# Patient Record
Sex: MALE | Race: WHITE
[De-identification: names, ages, dates, MRNs, and addresses within clinical notes are randomized per-mention and may not be internally consistent; named-entity substitution may affect disease eponyms.]

---

## 2018-03-05 ENCOUNTER — HOSPITAL ENCOUNTER (OUTPATIENT)
Dept: HOSPITAL 53 - M RAD | Age: 68
End: 2018-03-05
Attending: INTERNAL MEDICINE
Payer: COMMERCIAL

## 2018-03-05 DIAGNOSIS — Z85.118: ICD-10-CM

## 2018-03-05 DIAGNOSIS — R91.8: ICD-10-CM

## 2018-03-05 DIAGNOSIS — Z12.2: Primary | ICD-10-CM

## 2018-03-05 DIAGNOSIS — F17.218: ICD-10-CM

## 2018-04-06 ENCOUNTER — HOSPITAL ENCOUNTER (OUTPATIENT)
Dept: HOSPITAL 53 - M LAB | Age: 68
End: 2018-04-06
Attending: SURGERY
Payer: COMMERCIAL

## 2018-04-06 DIAGNOSIS — I71.4: Primary | ICD-10-CM

## 2018-04-06 LAB
BLOOD UREA NITROGEN: 16 MG/DL (ref 7–18)
CREATININE FOR GFR: 0.97 MG/DL (ref 0.7–1.3)
GFR SERPL CREATININE-BSD FRML MDRD: > 60 ML/MIN/{1.73_M2} (ref 49–?)

## 2019-12-16 ENCOUNTER — HOSPITAL ENCOUNTER (EMERGENCY)
Dept: HOSPITAL 53 - M ED | Age: 69
Discharge: ANOTHER HOSPITAL | End: 2019-12-16
Payer: COMMERCIAL

## 2019-12-16 VITALS — HEIGHT: 71 IN | WEIGHT: 164.35 LBS | BODY MASS INDEX: 23.01 KG/M2

## 2019-12-16 VITALS — DIASTOLIC BLOOD PRESSURE: 61 MMHG | SYSTOLIC BLOOD PRESSURE: 110 MMHG
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
## 2019-12-16 DIAGNOSIS — T82.898A: ICD-10-CM

## 2019-12-16 DIAGNOSIS — R91.1: ICD-10-CM

## 2019-12-16 DIAGNOSIS — Z88.8: ICD-10-CM

## 2019-12-16 DIAGNOSIS — F17.210: ICD-10-CM

## 2019-12-16 DIAGNOSIS — I71.4: ICD-10-CM

## 2019-12-16 DIAGNOSIS — J44.1: Primary | ICD-10-CM

## 2019-12-16 DIAGNOSIS — Z79.899: ICD-10-CM

## 2019-12-16 LAB
ALBUMIN SERPL BCG-MCNC: 3.7 GM/DL (ref 3.2–5.2)
ALT SERPL W P-5'-P-CCNC: 23 U/L (ref 12–78)
BASOPHILS # BLD AUTO: 0 10^3/UL (ref 0–0.2)
BASOPHILS NFR BLD AUTO: 0.6 % (ref 0–1)
BILIRUB CONJ SERPL-MCNC: < 0.1 MG/DL (ref 0–0.2)
BILIRUB SERPL-MCNC: 0.3 MG/DL (ref 0.2–1)
BUN SERPL-MCNC: 10 MG/DL (ref 7–18)
CALCIUM SERPL-MCNC: 9.3 MG/DL (ref 8.8–10.2)
CHLORIDE SERPL-SCNC: 100 MEQ/L (ref 98–107)
CK MB CFR.DF SERPL CALC: 2.21
CK MB SERPL-MCNC: 3.6 NG/ML (ref ?–3.6)
CK SERPL-CCNC: 163 U/L (ref 39–308)
CO2 SERPL-SCNC: 33 MEQ/L (ref 21–32)
CREAT SERPL-MCNC: 0.79 MG/DL (ref 0.7–1.3)
EOSINOPHIL # BLD AUTO: 0 10^3/UL (ref 0–0.5)
EOSINOPHIL NFR BLD AUTO: 0.6 % (ref 0–3)
GFR SERPL CREATININE-BSD FRML MDRD: > 60 ML/MIN/{1.73_M2} (ref 49–?)
GLUCOSE SERPL-MCNC: 90 MG/DL (ref 70–100)
HCT VFR BLD AUTO: 42.9 % (ref 42–52)
HGB BLD-MCNC: 13.8 G/DL (ref 13.5–17.5)
LIPASE SERPL-CCNC: 108 U/L (ref 73–393)
LYMPHOCYTES # BLD AUTO: 0.8 10^3/UL (ref 1.5–5)
LYMPHOCYTES NFR BLD AUTO: 17.3 % (ref 24–44)
MCH RBC QN AUTO: 31.2 PG (ref 27–33)
MCHC RBC AUTO-ENTMCNC: 32.2 G/DL (ref 32–36.5)
MCV RBC AUTO: 96.8 FL (ref 80–96)
MONOCYTES # BLD AUTO: 0.6 10^3/UL (ref 0–0.8)
MONOCYTES NFR BLD AUTO: 11.9 % (ref 0–5)
NEUTROPHILS # BLD AUTO: 3.3 10^3/UL (ref 1.5–8.5)
NEUTROPHILS NFR BLD AUTO: 69.4 % (ref 36–66)
NT-PRO BNP: 59 PG/ML (ref ?–125)
PLATELET # BLD AUTO: 155 10^3/UL (ref 150–450)
POTASSIUM SERPL-SCNC: 4.4 MEQ/L (ref 3.5–5.1)
PROT SERPL-MCNC: 7 GM/DL (ref 6.4–8.2)
RBC # BLD AUTO: 4.43 10^6/UL (ref 4.3–6.1)
SODIUM SERPL-SCNC: 139 MEQ/L (ref 136–145)
T4 FREE SERPL-MCNC: 1.34 NG/DL (ref 0.76–1.46)
TROPONIN I SERPL-MCNC: < 0.02 NG/ML (ref ?–0.1)
TSH SERPL DL<=0.005 MIU/L-ACNC: 1.4 UIU/ML (ref 0.36–3.74)
WBC # BLD AUTO: 4.8 10^3/UL (ref 4–10)

## 2019-12-16 PROCEDURE — 71275 CT ANGIOGRAPHY CHEST: CPT

## 2019-12-16 PROCEDURE — 85025 COMPLETE CBC W/AUTO DIFF WBC: CPT

## 2019-12-16 PROCEDURE — 93005 ELECTROCARDIOGRAM TRACING: CPT

## 2019-12-16 PROCEDURE — 93041 RHYTHM ECG TRACING: CPT

## 2019-12-16 PROCEDURE — 83880 ASSAY OF NATRIURETIC PEPTIDE: CPT

## 2019-12-16 PROCEDURE — 84439 ASSAY OF FREE THYROXINE: CPT

## 2019-12-16 PROCEDURE — 96374 THER/PROPH/DIAG INJ IV PUSH: CPT

## 2019-12-16 PROCEDURE — 74177 CT ABD & PELVIS W/CONTRAST: CPT

## 2019-12-16 PROCEDURE — 94640 AIRWAY INHALATION TREATMENT: CPT

## 2019-12-16 PROCEDURE — 71046 X-RAY EXAM CHEST 2 VIEWS: CPT

## 2019-12-16 PROCEDURE — 82553 CREATINE MB FRACTION: CPT

## 2019-12-16 PROCEDURE — 80048 BASIC METABOLIC PNL TOTAL CA: CPT

## 2019-12-16 PROCEDURE — 99285 EMERGENCY DEPT VISIT HI MDM: CPT

## 2019-12-16 PROCEDURE — 94760 N-INVAS EAR/PLS OXIMETRY 1: CPT

## 2019-12-16 PROCEDURE — 84443 ASSAY THYROID STIM HORMONE: CPT

## 2019-12-16 PROCEDURE — 84484 ASSAY OF TROPONIN QUANT: CPT

## 2019-12-16 PROCEDURE — 80076 HEPATIC FUNCTION PANEL: CPT

## 2019-12-16 PROCEDURE — 83690 ASSAY OF LIPASE: CPT

## 2019-12-16 PROCEDURE — 82550 ASSAY OF CK (CPK): CPT

## 2019-12-16 NOTE — REP
CT ANGIOGRAM CHEST:

 

TECHNIQUE:  Axial contrast enhanced images from the thoracic inlet to the upper

abdomen using 100 mL Isovue 370 intravenous contrast material with multiplanar

reformations.

 

There is no CT evidence of pulmonary embolism. There is no thoracic aortic

aneurysm or dissection. Heart is not enlarged. There is no mediastinal, hilar, or

chest wall lymphadenopathy. There is no pleural or pericardial effusion. There

are degenerative changes of the spine. Lungs show no evidence of acute

infiltrate. There is emphysematous change primarily in the upper lobes with

pleuroparenchymal scarring.

 

IMPRESSION:

 

No CT evidence of pulmonary embolism or aortic dissection. Emphysematous and

chronic fibrotic changes. No infiltrate.

 

 

Electronically Signed by

Gareth Fleming MD 12/17/2019 04:20 P

## 2019-12-16 NOTE — REP
CT ABDOMEN AND PELVIS WITH IV CONTRAST:

 

TECHNIQUE:  Axial contrast enhanced images from the lung bases to the pubic

symphysis using 100 mL Isovue 370 intravenous contrast material with multiplanar

reformations.

 

COMPARISON:  11/04/2013.

 

The liver demonstrates multiple hypodensities as on prior study most consistent

with multiple small liver cysts.  The gallbladder is grossly unremarkable.

Spleen is normal in size with no gross intrinsic abnormality.  No adrenal mass is

seen.  Pancreas appears unremarkable.  There is a left renal cyst slightly

greater than 1 cm in diameter.  There is no hydronephrosis.  Abdominal aortic

aneurysm is again seen.  It measures approximately 5.8 x 6.1 cm, previously 5.0 x

5.6 cm on the exam of 11/04/2013.  Aortobiiliac stent is again noted.  Posterior

to the bifurcated stent within the aneurysm there is a small endoleak which has

slightly increased in size since the priori study.

 

No adenopathy, free air or free fluid is seen.  No definite bowel wall thickening

is seen.  No definite pelvic mass is seen.  Urinary bladder is mildly distended

and grossly unremarkable.  There are degenerative changes of the spine.

 

IMPRESSION:

 

Aortobiiliac stent within an abdominal aortic aneurysm.  The aneurysm has mildly

increased in size prior study of 11/04/2013 as discussed above.  In addition

there is a small endoleak posteriorly within the aneurysm which has slightly

increased in size.

 

No other evidence of acute finding in the abdomen or pelvis.  There is a small

left inguinal hernia containing fat which is slightly edematous.

 

 

Electronically Signed by

Gareth Fleming MD 12/17/2019 04:21 P

## 2019-12-16 NOTE — REP
Chest x-ray:  Two views.

 

History:  Chest pain.

 

Comparison chest x-ray:  November 4, 2013.

 

Findings:  There is an old healed rib fracture on the left.  EKG electrodes and

oxygen delivery tubing are seen.  There is rather marked hyperinflation of the

lungs consistent with COPD.  No acute infiltrate is seen.  Heart is not enlarged.

Aorta is slightly tortuous.  Pulmonary artery essentially prominent question

pulmonary arterial hypertension.

 

Impression:

 

Evidence of COPD question pulmonary arterial hypertension.  No acute infiltrate.

 

 

Electronically Signed by

Cruzito Doty MD 12/16/2019 11:28 A

## 2019-12-17 NOTE — ECGEPIP
[facility] - ED

                                       

                                       Test Date:    2019

Pat Name:     LEATHA ORDAZ          Department:   

Patient ID:   [id number]                 Room:         -

Gender:       Male                     Technician:   TC

:          1950               Requested By: FERNANDO MENESES

Order Number: [id number]     Reading MD:   Lisa Eric

                                 Measurements

Intervals                              Axis          

Rate:         85                       P:            87

VA:           149                      QRS:          -78

QRSD:         88                       T:            74

QT:           347                                    

QTc:          413                                    

                           Interpretive Statements

SINUS RHYTHM

POSSIBLE RIGHT ATRIAL ENLARGEMENT

MARKED LEFT AXIS DEVIATION

DELAYED R PROGRESSION

LOW VOLTAGE LIMB

NO PRIOR

Electronically Signed on 2019 21:56:58 EST by Lisa Eric

## 2020-01-28 ENCOUNTER — HOSPITAL ENCOUNTER (EMERGENCY)
Dept: HOSPITAL 53 - M ED | Age: 70
Discharge: HOME | End: 2020-01-28
Payer: COMMERCIAL

## 2020-01-28 VITALS — DIASTOLIC BLOOD PRESSURE: 73 MMHG | SYSTOLIC BLOOD PRESSURE: 123 MMHG

## 2020-01-28 VITALS — BODY MASS INDEX: 20.9 KG/M2 | WEIGHT: 154.32 LBS | HEIGHT: 72 IN

## 2020-01-28 DIAGNOSIS — Z88.3: ICD-10-CM

## 2020-01-28 DIAGNOSIS — J44.9: ICD-10-CM

## 2020-01-28 DIAGNOSIS — Z79.51: ICD-10-CM

## 2020-01-28 DIAGNOSIS — M51.36: ICD-10-CM

## 2020-01-28 DIAGNOSIS — F17.210: ICD-10-CM

## 2020-01-28 DIAGNOSIS — K43.9: ICD-10-CM

## 2020-01-28 DIAGNOSIS — R10.9: Primary | ICD-10-CM

## 2020-01-28 DIAGNOSIS — Z79.899: ICD-10-CM

## 2020-01-28 DIAGNOSIS — I71.4: ICD-10-CM

## 2020-01-28 LAB
ALBUMIN SERPL BCG-MCNC: 3.6 GM/DL (ref 3.2–5.2)
ALT SERPL W P-5'-P-CCNC: 24 U/L (ref 12–78)
APTT BLD: 29.3 SECONDS (ref 25–38.4)
BASOPHILS # BLD AUTO: 0 10^3/UL (ref 0–0.2)
BASOPHILS NFR BLD AUTO: 0.2 % (ref 0–1)
BILIRUB CONJ SERPL-MCNC: 0.1 MG/DL (ref 0–0.2)
BILIRUB SERPL-MCNC: 0.5 MG/DL (ref 0.2–1)
CK MB CFR.DF SERPL CALC: 2.5
CK MB SERPL-MCNC: 5.4 NG/ML (ref ?–3.6)
CK SERPL-CCNC: 216 U/L (ref 39–308)
EOSINOPHIL # BLD AUTO: 0 10^3/UL (ref 0–0.5)
EOSINOPHIL NFR BLD AUTO: 1 % (ref 0–3)
HCT VFR BLD AUTO: 42.4 % (ref 42–52)
HGB BLD-MCNC: 13.5 G/DL (ref 13.5–17.5)
INR PPP: 1.08
LIPASE SERPL-CCNC: 125 U/L (ref 73–393)
LYMPHOCYTES # BLD AUTO: 0.6 10^3/UL (ref 1.5–5)
LYMPHOCYTES NFR BLD AUTO: 13.9 % (ref 24–44)
MCH RBC QN AUTO: 30.8 PG (ref 27–33)
MCHC RBC AUTO-ENTMCNC: 31.8 G/DL (ref 32–36.5)
MCV RBC AUTO: 96.8 FL (ref 80–96)
MONOCYTES # BLD AUTO: 0.6 10^3/UL (ref 0–0.8)
MONOCYTES NFR BLD AUTO: 15.1 % (ref 0–5)
NEUTROPHILS # BLD AUTO: 2.9 10^3/UL (ref 1.5–8.5)
NEUTROPHILS NFR BLD AUTO: 69.8 % (ref 36–66)
PLATELET # BLD AUTO: 125 10^3/UL (ref 150–450)
PROT SERPL-MCNC: 6.6 GM/DL (ref 6.4–8.2)
PROTHROMBIN TIME: 13.7 SECONDS (ref 11.8–14)
RBC # BLD AUTO: 4.38 10^6/UL (ref 4.3–6.1)
TROPONIN I SERPL-MCNC: < 0.02 NG/ML (ref ?–0.1)
WBC # BLD AUTO: 4.2 10^3/UL (ref 4–10)

## 2020-01-28 PROCEDURE — 80076 HEPATIC FUNCTION PANEL: CPT

## 2020-01-28 PROCEDURE — 83605 ASSAY OF LACTIC ACID: CPT

## 2020-01-28 PROCEDURE — 86901 BLOOD TYPING SEROLOGIC RH(D): CPT

## 2020-01-28 PROCEDURE — 71275 CT ANGIOGRAPHY CHEST: CPT

## 2020-01-28 PROCEDURE — 82550 ASSAY OF CK (CPK): CPT

## 2020-01-28 PROCEDURE — 87040 BLOOD CULTURE FOR BACTERIA: CPT

## 2020-01-28 PROCEDURE — 99285 EMERGENCY DEPT VISIT HI MDM: CPT

## 2020-01-28 PROCEDURE — 85730 THROMBOPLASTIN TIME PARTIAL: CPT

## 2020-01-28 PROCEDURE — 83690 ASSAY OF LIPASE: CPT

## 2020-01-28 PROCEDURE — 96375 TX/PRO/DX INJ NEW DRUG ADDON: CPT

## 2020-01-28 PROCEDURE — 86850 RBC ANTIBODY SCREEN: CPT

## 2020-01-28 PROCEDURE — 93041 RHYTHM ECG TRACING: CPT

## 2020-01-28 PROCEDURE — 86900 BLOOD TYPING SEROLOGIC ABO: CPT

## 2020-01-28 PROCEDURE — 74174 CTA ABD&PLVS W/CONTRAST: CPT

## 2020-01-28 PROCEDURE — 84484 ASSAY OF TROPONIN QUANT: CPT

## 2020-01-28 PROCEDURE — 96374 THER/PROPH/DIAG INJ IV PUSH: CPT

## 2020-01-28 PROCEDURE — 85025 COMPLETE CBC W/AUTO DIFF WBC: CPT

## 2020-01-28 PROCEDURE — 80047 BASIC METABLC PNL IONIZED CA: CPT

## 2020-01-28 PROCEDURE — 85610 PROTHROMBIN TIME: CPT

## 2020-01-28 PROCEDURE — 93005 ELECTROCARDIOGRAM TRACING: CPT

## 2020-01-28 PROCEDURE — 81001 URINALYSIS AUTO W/SCOPE: CPT

## 2020-01-28 PROCEDURE — 82553 CREATINE MB FRACTION: CPT

## 2020-01-28 NOTE — ECGEPIP
[facility] - ED

                                       

                                       Test Date:    2020

Pat Name:     LEATHA ORDAZ          Department:   

Patient ID:   [id number]                 Room:         -

Gender:       Male                     Technician:   

:          1950               Requested By: Maja Lundborg-Gray 

Order Number: [id number]     Reading MD:   Ayaan Walsh

                                 Measurements

Intervals                              Axis          

Rate:         89                       P:            83

UT:           159                      QRS:          -90

QRSD:         88                       T:            82

QT:           345                                    

QTc:          422                                    

                           Interpretive Statements

SINUS RHYTHM

LEFT AXIS DEVIATION

SIMILAR TO 19

Electronically Signed on 2020 18:32:31 EST by Ayaan Walsh

## 2020-01-28 NOTE — REP
CT ANGIOGRAM ABDOMEN AND PELVIS:

 

CT angiogram of the abdomen and pelvis performed following the intravenous

administration of 100 mL of Isovue 370.  Sagittal, coronal and 3D MIP

reconstruction images are performed.

 

Comparison is made with a made with a prior study of 12/16/2019.

 

Distal abdominal aortic aneurysm is unchanged in size measuring approximately 5.8

x 6.1 cm.  Aortobiiliac stent is patent.  Small posterior endoleak in the

aneurysm has decreased in size when compared to the prior study.

 

Multiple cysts are again seen in the liver and there are a few bilateral renal

cysts.  Spleen, adrenals, and pancreas are unremarkable.  I see no adenopathy.

There is no free air or free fluid.  I see no evidence of bowel wall thickening.

Urinary bladder is not well distended and not well evaluated.  Small left

inguinal hernia contains fat and is unchanged compared to the prior study.  Mild

compression deformity of L1 is stable. There are degenerative changes of the

spine.

 

IMPRESSION:

 

Stable distal abdominal aortic aneurysm.  Small posterior endoleak has decreased

since the prior study of 12/16/2019. There is also no change in the small left

inguinal hernia containing fat.  The size of the distal abdominal aneurysm is

stable.

 

 

Electronically Signed by

Gareth Fleming MD 01/29/2020 01:38 P

## 2020-01-28 NOTE — REP
CT ANGIO CHEST:

 

TECHNIQUE:  Axial contrast enhanced images from the thoracic inlet to the upper

abdomen using 100 mL Isovue 370 intravenous contrast material with multiplanar

reformations.

 

Comparison 12/16/2019

 

There is no evidence of aneurysm of the thoracic aorta.  No dissection is seen.

Visualized pulmonary arteries demonstrate no definite filling defect. The heart

is not enlarged.  Very mild anterior inferior pericardial fluid or thickening is

again noted. There is no pleural effusion.  There is diffuse emphysematous change

and fibrotic change in both lungs with no consolidating infiltrate. There are

degenerative changes of the spine.  Old compression deformity of L1 is noted.

 

IMPRESSION:

 

Diffuse emphysematous change and interstitial fibrosis.  No evidence of thoracic

aortic aneurysm or dissection.

 

 

Electronically Signed by

Gareth Fleming MD 01/29/2020 01:38 P

## 2020-01-29 ENCOUNTER — HOSPITAL ENCOUNTER (EMERGENCY)
Dept: HOSPITAL 53 - M ED | Age: 70
Discharge: HOME | End: 2020-01-29
Payer: COMMERCIAL

## 2020-01-29 VITALS — WEIGHT: 154.32 LBS | HEIGHT: 72 IN | BODY MASS INDEX: 20.9 KG/M2

## 2020-01-29 VITALS — SYSTOLIC BLOOD PRESSURE: 106 MMHG | DIASTOLIC BLOOD PRESSURE: 62 MMHG

## 2020-01-29 DIAGNOSIS — K76.89: ICD-10-CM

## 2020-01-29 DIAGNOSIS — K40.90: Primary | ICD-10-CM

## 2020-01-29 DIAGNOSIS — R94.31: ICD-10-CM

## 2020-01-29 DIAGNOSIS — F17.210: ICD-10-CM

## 2020-01-29 DIAGNOSIS — Z79.899: ICD-10-CM

## 2020-01-29 DIAGNOSIS — K59.00: ICD-10-CM

## 2020-01-29 DIAGNOSIS — Z86.73: ICD-10-CM

## 2020-01-29 DIAGNOSIS — Z79.51: ICD-10-CM

## 2020-01-29 DIAGNOSIS — J44.1: ICD-10-CM

## 2020-01-29 DIAGNOSIS — I71.4: ICD-10-CM

## 2020-01-29 DIAGNOSIS — N28.1: ICD-10-CM

## 2020-01-29 DIAGNOSIS — K21.9: ICD-10-CM

## 2020-01-29 DIAGNOSIS — F90.9: ICD-10-CM

## 2020-01-29 DIAGNOSIS — Z88.3: ICD-10-CM

## 2020-01-29 LAB
ALBUMIN SERPL BCG-MCNC: 3.6 GM/DL (ref 3.2–5.2)
ALT SERPL W P-5'-P-CCNC: 22 U/L (ref 12–78)
BASOPHILS # BLD AUTO: 0 10^3/UL (ref 0–0.2)
BASOPHILS NFR BLD AUTO: 0.5 % (ref 0–1)
BILIRUB CONJ SERPL-MCNC: 0.1 MG/DL (ref 0–0.2)
BILIRUB SERPL-MCNC: 0.5 MG/DL (ref 0.2–1)
CK MB CFR.DF SERPL CALC: 2.32
CK MB SERPL-MCNC: 4.6 NG/ML (ref ?–3.6)
CK SERPL-CCNC: 198 U/L (ref 39–308)
EOSINOPHIL # BLD AUTO: 0 10^3/UL (ref 0–0.5)
EOSINOPHIL NFR BLD AUTO: 0.5 % (ref 0–3)
HCT VFR BLD AUTO: 39.5 % (ref 42–52)
HGB BLD-MCNC: 12.6 G/DL (ref 13.5–17.5)
LIPASE SERPL-CCNC: 81 U/L (ref 73–393)
LYMPHOCYTES # BLD AUTO: 0.5 10^3/UL (ref 1.5–5)
LYMPHOCYTES NFR BLD AUTO: 12.2 % (ref 24–44)
MCH RBC QN AUTO: 30.9 PG (ref 27–33)
MCHC RBC AUTO-ENTMCNC: 31.9 G/DL (ref 32–36.5)
MCV RBC AUTO: 96.8 FL (ref 80–96)
MONOCYTES # BLD AUTO: 0.6 10^3/UL (ref 0–0.8)
MONOCYTES NFR BLD AUTO: 14.8 % (ref 0–5)
NEUTROPHILS # BLD AUTO: 3.1 10^3/UL (ref 1.5–8.5)
NEUTROPHILS NFR BLD AUTO: 71.8 % (ref 36–66)
PLATELET # BLD AUTO: 116 10^3/UL (ref 150–450)
PROT SERPL-MCNC: 6.3 GM/DL (ref 6.4–8.2)
RBC # BLD AUTO: 4.08 10^6/UL (ref 4.3–6.1)
TROPONIN I SERPL-MCNC: < 0.02 NG/ML (ref ?–0.1)
WBC # BLD AUTO: 4.3 10^3/UL (ref 4–10)

## 2020-01-29 PROCEDURE — 36600 WITHDRAWAL OF ARTERIAL BLOOD: CPT

## 2020-01-29 PROCEDURE — 96374 THER/PROPH/DIAG INJ IV PUSH: CPT

## 2020-01-29 PROCEDURE — 99285 EMERGENCY DEPT VISIT HI MDM: CPT

## 2020-01-29 PROCEDURE — 83605 ASSAY OF LACTIC ACID: CPT

## 2020-01-29 PROCEDURE — 96375 TX/PRO/DX INJ NEW DRUG ADDON: CPT

## 2020-01-29 PROCEDURE — 93005 ELECTROCARDIOGRAM TRACING: CPT

## 2020-01-29 PROCEDURE — 71275 CT ANGIOGRAPHY CHEST: CPT

## 2020-01-29 PROCEDURE — 94760 N-INVAS EAR/PLS OXIMETRY 1: CPT

## 2020-01-29 PROCEDURE — 85025 COMPLETE CBC W/AUTO DIFF WBC: CPT

## 2020-01-29 PROCEDURE — 70450 CT HEAD/BRAIN W/O DYE: CPT

## 2020-01-29 PROCEDURE — 80076 HEPATIC FUNCTION PANEL: CPT

## 2020-01-29 PROCEDURE — 82550 ASSAY OF CK (CPK): CPT

## 2020-01-29 PROCEDURE — 96361 HYDRATE IV INFUSION ADD-ON: CPT

## 2020-01-29 PROCEDURE — 83690 ASSAY OF LIPASE: CPT

## 2020-01-29 PROCEDURE — 74174 CTA ABD&PLVS W/CONTRAST: CPT

## 2020-01-29 PROCEDURE — 84484 ASSAY OF TROPONIN QUANT: CPT

## 2020-01-29 PROCEDURE — 82803 BLOOD GASES ANY COMBINATION: CPT

## 2020-01-29 PROCEDURE — 82553 CREATINE MB FRACTION: CPT

## 2020-01-29 PROCEDURE — 87040 BLOOD CULTURE FOR BACTERIA: CPT

## 2020-01-29 PROCEDURE — 96376 TX/PRO/DX INJ SAME DRUG ADON: CPT

## 2020-01-29 PROCEDURE — 80047 BASIC METABLC PNL IONIZED CA: CPT

## 2020-01-29 NOTE — REP
CT ANGIOGRAM CHEST:

 

TECHNIQUE:  Axial contrast enhanced images from the thoracic inlet to the upper

abdomen using 100 mL Isovue 370 intravenous contrast material with multiplanar

reformations.

 

COMPARISON: 01/20/2020 as well as other prior exams.

 

There is no CT evidence of pulmonary embolism.  There is no thoracic aneurysm or

dissection.  The heart is not enlarged. There is no pleural or pericardial

effusion.  There is no mediastinal, hilar or chest wall lymphadenopathy.  There

are degenerative changes of the spine.  Diffuse emphysematous and fibrotic

changes are again noted unchanged.  No new infiltrate is seen.

 

IMPRESSION:

 

Stable exam with no acute finding.

 

 

Electronically Signed by

Gareth Fleming MD 01/29/2020 11:08 P

## 2020-01-29 NOTE — ECGEPIP
[facility] - ED

                                       

                                       Test Date:    2020

Pat Name:     LEATHA ORDAZ          Department:   

Patient ID:   [id number]                 Room:         -

Gender:       Male                     Technician:   amy

:          1950               Requested By: Lisa Eric 

Order Number: [id number]     Reading MD:   Francisco Burns

                                 Measurements

Intervals                              Axis          

Rate:         80                       P:            269

ME:           93                       QRS:          -16

QRSD:         98                       T:            69

QT:           354                                    

QTc:          411                                    

                           Interpretive Statements

JUNCTIONAL RHYTHM

Low QRS complex voltage in the limb leads

Nonspecific ST-T wave abnormalities

Baseline artifact

Electronically Signed on 2020 14:58:23 EST by Francisco Burns

## 2020-01-29 NOTE — REP
CT BRAIN WITHOUT IV CONTRAST:

 

CT brain performed without IV contrast.  Coronal reconstruction images are

performed.

 

Ventricles are normal in size and position with no midline shift or mass effect.

Gray/white differentiation is well maintained.  There is no acute intracranial

hemorrhage or extra-axial fluid collection.  Bone window examination is

unremarkable.  Visualized paranasal sinuses are clear.

 

IMPRESSION:

 

Negative noncontrast CT of the brain.

 

 

Electronically Signed by

Gareth Fleming MD 01/29/2020 11:07 P

## 2020-01-29 NOTE — REP
CT ANGIOGRAM ABDOMEN AND PELVIS:

 

CT angiogram of the abdomen and pelvis performed following the intravenous

administration of 100 mL of Isovue 370.  Sagittal, coronal, and 3D MIP

reconstruction images are performed.

 

Comparison is made with prior studies, most recently 01/28/2020.

 

Once again, there are multiple cysts seen in the liver.  Spleen is normal in

size. The adrenals and pancreas are unremarkable.  A cyst is see in each kidney

with no hydronephrosis.  Distal abdominal aortic aneurysm is unchanged in size

and appearance.  Aortobiiliac stent is patent, filling with contrast.  A small

posterior endoleak in the aneurysm is stable.  No adenopathy is seen.  There is

no free air.  The appendix appears normal.  Left inguinal hernia is again noted.

There is now a moderately dilated fluid-filled small bowel loop in the hernia

with surrounding edema.  The more proximal small bowel appears increased in

caliber suggesting small bowel obstruction.  I suspect there is an incarcerated

small bowel loop in the left inguinal hernia with possible strangulation.  Trace

free fluid is seen in the pelvis.

 

IMPRESSION:

 

There is now a moderately dilated small bowel loop in the left inguinal hernia,

likely incarcerated and possibly strangulated.  There is increased caliber of the

more proximal small bowel suggesting small bowel obstruction.  No free air.

Trace free fluid in the pelvis.

 

Distal abdominal aortic aneurysm is unchanged in size and appearance.

Aortobi-iliac stent remains patent.  Small endoleak in the aneurysm is stable.

 

 

Electronically Signed by

Gareth Fleming MD 01/29/2020 11:09 P

## 2020-02-24 ENCOUNTER — HOSPITAL ENCOUNTER (EMERGENCY)
Dept: HOSPITAL 53 - M ED | Age: 70
Discharge: ANOTHER HOSPITAL | End: 2020-02-24
Payer: COMMERCIAL

## 2020-02-24 VITALS — HEIGHT: 72 IN | BODY MASS INDEX: 19.56 KG/M2 | WEIGHT: 144.4 LBS

## 2020-02-24 VITALS — OXYGEN SATURATION: 99 %

## 2020-02-24 VITALS — DIASTOLIC BLOOD PRESSURE: 76 MMHG | SYSTOLIC BLOOD PRESSURE: 109 MMHG

## 2020-02-24 DIAGNOSIS — G93.5: ICD-10-CM

## 2020-02-24 DIAGNOSIS — F17.210: ICD-10-CM

## 2020-02-24 DIAGNOSIS — Z79.899: ICD-10-CM

## 2020-02-24 DIAGNOSIS — R41.82: ICD-10-CM

## 2020-02-24 DIAGNOSIS — S06.5X9A: Primary | ICD-10-CM

## 2020-02-24 DIAGNOSIS — M43.02: ICD-10-CM
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
## 2020-02-24 DIAGNOSIS — Z79.51: ICD-10-CM
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
## 2020-02-24 DIAGNOSIS — J44.1: ICD-10-CM

## 2020-02-24 DIAGNOSIS — S00.03XA: ICD-10-CM

## 2020-02-24 DIAGNOSIS — Z88.3: ICD-10-CM

## 2020-02-24 LAB
ALBUMIN SERPL BCG-MCNC: 3.5 GM/DL (ref 3.2–5.2)
ALT SERPL W P-5'-P-CCNC: 39 U/L (ref 12–78)
APAP SERPL-MCNC: < 2 UG/ML (ref 10–30)
BASE EXCESS BLDA CALC-SCNC: 4.9 MMOL/L (ref -2–2)
BASOPHILS # BLD AUTO: 0 10^3/UL (ref 0–0.2)
BASOPHILS NFR BLD AUTO: 0.2 % (ref 0–1)
BILIRUB CONJ SERPL-MCNC: 0.2 MG/DL (ref 0–0.2)
BILIRUB SERPL-MCNC: 0.5 MG/DL (ref 0.2–1)
BUN SERPL-MCNC: 12 MG/DL (ref 7–18)
CALCIUM SERPL-MCNC: 8.6 MG/DL (ref 8.8–10.2)
CHLORIDE SERPL-SCNC: 94 MEQ/L (ref 98–107)
CK MB CFR.DF SERPL CALC: 2.81
CK MB CFR.DF SERPL CALC: 3.02
CK MB SERPL-MCNC: 13 NG/ML (ref ?–3.6)
CK MB SERPL-MCNC: 16.9 NG/ML (ref ?–3.6)
CK SERPL-CCNC: 430 U/L (ref 39–308)
CK SERPL-CCNC: 601 U/L (ref 39–308)
CO2 BLDA CALC-SCNC: 34.7 MEQ/L (ref 23–31)
CO2 SERPL-SCNC: 35 MEQ/L (ref 21–32)
CREAT SERPL-MCNC: 0.68 MG/DL (ref 0.7–1.3)
EOSINOPHIL # BLD AUTO: 0 10^3/UL (ref 0–0.5)
EOSINOPHIL NFR BLD AUTO: 0.2 % (ref 0–3)
ETHANOL SERPL-MCNC: < 0.003 % (ref 0–0.01)
GFR SERPL CREATININE-BSD FRML MDRD: > 60 ML/MIN/{1.73_M2} (ref 49–?)
GLUCOSE SERPL-MCNC: 110 MG/DL (ref 70–100)
HCO3 BLDA-SCNC: 32.7 MEQ/L (ref 22–26)
HCO3 STD BLDA-SCNC: 28.8 MEQ/L (ref 22–26)
HCT VFR BLD AUTO: 38.8 % (ref 42–52)
HGB BLD-MCNC: 12.9 G/DL (ref 13.5–17.5)
LYMPHOCYTES # BLD AUTO: 0.4 10^3/UL (ref 1.5–5)
LYMPHOCYTES NFR BLD AUTO: 7.4 % (ref 24–44)
MCH RBC QN AUTO: 31.1 PG (ref 27–33)
MCHC RBC AUTO-ENTMCNC: 33.2 G/DL (ref 32–36.5)
MCV RBC AUTO: 93.5 FL (ref 80–96)
MONOCYTES # BLD AUTO: 0.6 10^3/UL (ref 0–0.8)
MONOCYTES NFR BLD AUTO: 11.4 % (ref 0–5)
NEUTROPHILS # BLD AUTO: 4.5 10^3/UL (ref 1.5–8.5)
NEUTROPHILS NFR BLD AUTO: 80.6 % (ref 36–66)
PCO2 BLDA: 64.4 MMHG (ref 35–45)
PH BLDA: 7.32 UNITS (ref 7.35–7.45)
PLATELET # BLD AUTO: 146 10^3/UL (ref 150–450)
PO2 BLDA: 90 MMHG (ref 75–100)
POTASSIUM SERPL-SCNC: 4.8 MEQ/L (ref 3.5–5.1)
PROT SERPL-MCNC: 6.6 GM/DL (ref 6.4–8.2)
RBC # BLD AUTO: 4.15 10^6/UL (ref 4.3–6.1)
SALICYLATES SERPL-MCNC: 2.1 MG/DL (ref 5–30)
SAO2 % BLDA: 96.4 % (ref 95–99)
SODIUM SERPL-SCNC: 132 MEQ/L (ref 136–145)
TROPONIN I SERPL-MCNC: < 0.02 NG/ML (ref ?–0.1)
TROPONIN I SERPL-MCNC: < 0.02 NG/ML (ref ?–0.1)
WBC # BLD AUTO: 5.5 10^3/UL (ref 4–10)

## 2020-02-24 PROCEDURE — 96375 TX/PRO/DX INJ NEW DRUG ADDON: CPT

## 2020-02-24 PROCEDURE — 71045 X-RAY EXAM CHEST 1 VIEW: CPT

## 2020-02-24 PROCEDURE — 82803 BLOOD GASES ANY COMBINATION: CPT

## 2020-02-24 PROCEDURE — 80048 BASIC METABOLIC PNL TOTAL CA: CPT

## 2020-02-24 PROCEDURE — 93041 RHYTHM ECG TRACING: CPT

## 2020-02-24 PROCEDURE — 70450 CT HEAD/BRAIN W/O DYE: CPT

## 2020-02-24 PROCEDURE — 80076 HEPATIC FUNCTION PANEL: CPT

## 2020-02-24 PROCEDURE — 36600 WITHDRAWAL OF ARTERIAL BLOOD: CPT

## 2020-02-24 PROCEDURE — 99285 EMERGENCY DEPT VISIT HI MDM: CPT

## 2020-02-24 PROCEDURE — 12053 INTMD RPR FACE/MM 5.1-7.5 CM: CPT

## 2020-02-24 PROCEDURE — 94640 AIRWAY INHALATION TREATMENT: CPT

## 2020-02-24 PROCEDURE — 94660 CPAP INITIATION&MGMT: CPT

## 2020-02-24 PROCEDURE — 84484 ASSAY OF TROPONIN QUANT: CPT

## 2020-02-24 PROCEDURE — 96372 THER/PROPH/DIAG INJ SC/IM: CPT

## 2020-02-24 PROCEDURE — 96361 HYDRATE IV INFUSION ADD-ON: CPT

## 2020-02-24 PROCEDURE — 84146 ASSAY OF PROLACTIN: CPT

## 2020-02-24 PROCEDURE — 96374 THER/PROPH/DIAG INJ IV PUSH: CPT

## 2020-02-24 PROCEDURE — 94760 N-INVAS EAR/PLS OXIMETRY 1: CPT

## 2020-02-24 PROCEDURE — 87040 BLOOD CULTURE FOR BACTERIA: CPT

## 2020-02-24 PROCEDURE — 82140 ASSAY OF AMMONIA: CPT

## 2020-02-24 PROCEDURE — 85025 COMPLETE CBC W/AUTO DIFF WBC: CPT

## 2020-02-24 PROCEDURE — 72125 CT NECK SPINE W/O DYE: CPT

## 2020-02-24 PROCEDURE — 82553 CREATINE MB FRACTION: CPT

## 2020-02-24 PROCEDURE — 82550 ASSAY OF CK (CPK): CPT

## 2020-02-24 PROCEDURE — 31500 INSERT EMERGENCY AIRWAY: CPT

## 2020-02-24 NOTE — REP
Portable chest x-ray:  Sitting AP view.

 

History:  Altered mental status.

 

Comparison study:  December 16, 2019.

 

Findings:  Oxygen delivery tubing and monitoring electrodes are seen.  The lungs

are hyperinflated consistent with some degree of COPD.  No infiltrate is seen.

Pleural angles are sharp.  There is an old healed rib fracture on the left

posteriorly.  Heart is not enlarged.  Pulmonary vasculature is not increased

 

Impression:

 

Hyperinflation consistent with some degree of COPD.  No acute infiltrate seen.

 

 

Electronically Signed by

Cruzito Doty MD 02/24/2020 03:57 P

## 2020-02-24 NOTE — REP
Portable chest x-ray:  Two views presented.

 

History:  Endotracheal tube placement.

 

Comparison chest x-ray:  February 24, 2020.

 

Findings:  Endotracheal tube is seen in good position at the level of proximal

clavicles.  EKG monitoring electrodes overlie the chest.  The lungs are

hyperinflated but remain clear.  There is an old healed rib fracture on the

left.

 

Impression:

 

Endotracheal tube in good position.  Hyperinflation consistent with COPD.  No

acute infiltrate.

 

 

Electronically Signed by

Cruzito Doty MD 02/24/2020 07:04 P

## 2020-02-24 NOTE — REP
CT brain without contrast:

 

History:  Trauma.

 

Findings:  Digital preliminary  radiograph is unremarkable.  There is soft

tissue swelling over the left anterior orbital margin and frontal bone region.

No skull fractures seen.  Visualized paranasal sinuses are clear including the

frontal sinuses.  No intraorbital abnormality is appreciated.

 

On soft tissue window settings, lateral, third, and fourth ventricles are normal

in size and position.  Gray-white differentiation pattern is intact above and

below the tentorium.  There is no evidence of intracranial hemorrhage.  No

extra-axial fluid collection is seen.  No mass or midline shift is observed.

 

Impression:

 

Left frontal scalp swelling and hematoma.  No skull fracture or intracranial

injury seen.

 

 

Electronically Signed by

Cruzito Doty MD 02/24/2020 02:17 P

## 2020-02-24 NOTE — REPVR
PROCEDURE INFORMATION: 

Exam: CT Head Without Contrast 

Exam date and time: 2/24/2020 6:03 PM 

Age: 69 years old 

Clinical indication: Altered mental status/memory loss 



TECHNIQUE: 

Imaging protocol: Computed tomography of the head without contrast. 

Radiation optimization: All CT scans at this facility use at least one of these 

dose optimization techniques: automated exposure control; mA and/or kV 

adjustment per patient size (includes targeted exams where dose is matched to 

clinical indication); or iterative reconstruction. 



COMPARISON: 

CT Head without contrast 2/24/2020 1:57 PM 



FINDINGS: 



Brain: Large acute left-sided cerebral convexity subdural hematoma measuring 

approximately 1.2 cm in thickness. Trace acute subdural hemorrhage is also 

likely present along the posterior falx cerebri. Generalized sulcal effacement 

of left cerebral sulci and effacement of the left lateral ventricle. 1.3 cm 

left-to-right midline shift with subfalcine and early uncal herniation. 

Effacement of basilar cisterns. Gray-white matter differentiation is preserved. 

Bones/joints: No acute osseus lesion or fracture. 

Sinuses: Unremarkable as visualized. 

Mastoid air cells: Unremarkable. 

Soft tissues: Small frontal scalp contusion. 



IMPRESSION: 

1. Large acute left-sided cerebral convexity subdural hematoma with associated 

1.3 cm left-to-right midline shift. Subfalcine and early uncal herniation. 

Recommend neurosurgery consultation. 

2. Trace acute subdural hemorrhage is also likely present along the posterior 

falx cerebri. 



THIS REPORT CONTAINS FINDINGS THAT MAY BE CRITICAL TO PATIENT CARE. The 

findings were verbally communicated via telephone conference with Dr. Burns at 

6:44 PM EST on 2/24/2020. The findings were acknowledged and understood.





Electronically signed by: Jason Castellon On 02/24/2020  18:47:08 PM

## 2020-02-24 NOTE — REP
CT study of the cervical spine without contrast:

 

History: Trauma.

 

Technique:  Helical scanning is acquired and overlapping 2 mm high resolution

axial images were generated and reviewed at bone and soft tissue window settings.

Coronal and sagittal multiplanar re-formations images are generated.

 

CT findings:  There is no evidence of cervical spine element fracture.  No skull

base fracture is seen.  Cervical vertebral body heights are preserved.  Alignment

is normal.  Facet joints are normally aligned bilaterally at each cervical level

on multiplanar re-formations images.  There is no evidence of intraspinal or

paraspinal hematoma.  No extra vertebral abnormality is seen. There is

straightening and slight reversal of the normal cervical lordosis.  Degenerative

disc changes are seen at each cervical level.  There is osteoarthritis of the

articulation between the dens and the anterior arch of C1.  Old soft tissue

calcifications are visible, dorsal to the spinous process sees in the mid

cervical spine.  There are bullous and emphysematous changes in the lung apices

bilaterally.

 

Impression:

 

Degenerative spondylosis changes in the cervical spine.  Otherwise negative CT

study of the cervical spine without contrast.  No fracture seen.

 

 

Electronically Signed by

Cruzito Doty MD 02/24/2020 02:20 P

## 2020-02-25 LAB — PROLACTIN SERPL-MCNC: 21 NG/ML (ref 2.1–17.7)
